# Patient Record
Sex: FEMALE | Race: BLACK OR AFRICAN AMERICAN | URBAN - METROPOLITAN AREA
[De-identification: names, ages, dates, MRNs, and addresses within clinical notes are randomized per-mention and may not be internally consistent; named-entity substitution may affect disease eponyms.]

---

## 2023-01-06 ENCOUNTER — OFFICE VISIT (OUTPATIENT)
Dept: URBAN - METROPOLITAN AREA CLINIC 84 | Facility: CLINIC | Age: 28
End: 2023-01-06
Payer: COMMERCIAL

## 2023-01-06 ENCOUNTER — WEB ENCOUNTER (OUTPATIENT)
Dept: URBAN - METROPOLITAN AREA CLINIC 84 | Facility: CLINIC | Age: 28
End: 2023-01-06

## 2023-01-06 VITALS
WEIGHT: 188.6 LBS | SYSTOLIC BLOOD PRESSURE: 102 MMHG | TEMPERATURE: 96.9 F | BODY MASS INDEX: 35.61 KG/M2 | HEART RATE: 71 BPM | DIASTOLIC BLOOD PRESSURE: 69 MMHG | HEIGHT: 61 IN

## 2023-01-06 DIAGNOSIS — R10.13 DYSPEPSIA: ICD-10-CM

## 2023-01-06 DIAGNOSIS — R07.89 OTHER CHEST PAIN: ICD-10-CM

## 2023-01-06 DIAGNOSIS — R14.2 ERUCTATION: ICD-10-CM

## 2023-01-06 PROCEDURE — 99204 OFFICE O/P NEW MOD 45 MIN: CPT | Performed by: INTERNAL MEDICINE

## 2023-01-06 NOTE — HPI-TODAY'S VISIT:
This patient was referred by Dr. Precious Rico for an evaluation of abdominal pain.  A copy of this will be sent to the referring provider.  Since 2018 she has been having intermittent symptoms of belch and indigestion.  She has had significant weight gain.   She has not changed her diet at all.  he belch does increase with dairy and greasy foods.  She phillip GERD but she feel like gas is trapped in her chest.  SHe denies abdominal pain.  She has occasional N/V associated with the belch.  She denies dysphagia.  She denies early satiety.  She has chronic constipation.  She can go a wek without a BM.  She denies LGI bleed or melena.  Ther eis no change in her belch if she doesn't  her BM's. She tried PPI and pepcid without any change in her symptoms.  It caused a HA so she stopped it.  She has asthma/seasonal allergies/eczema.  She developed hives last week.  She is on probiotics without relief

## 2023-02-01 ENCOUNTER — OFFICE VISIT (OUTPATIENT)
Dept: URBAN - METROPOLITAN AREA SURGERY CENTER 20 | Facility: SURGERY CENTER | Age: 28
End: 2023-02-01
Payer: COMMERCIAL

## 2023-02-01 ENCOUNTER — TELEPHONE ENCOUNTER (OUTPATIENT)
Dept: URBAN - METROPOLITAN AREA CLINIC 92 | Facility: CLINIC | Age: 28
End: 2023-02-01

## 2023-02-01 DIAGNOSIS — K21.00 ALKALINE REFLUX ESOPHAGITIS: ICD-10-CM

## 2023-02-01 DIAGNOSIS — K29.30 CHRONIC SUPERFICIAL GASTRITIS: ICD-10-CM

## 2023-02-01 PROCEDURE — G8907 PT DOC NO EVENTS ON DISCHARG: HCPCS | Performed by: INTERNAL MEDICINE

## 2023-02-01 PROCEDURE — 43239 EGD BIOPSY SINGLE/MULTIPLE: CPT | Performed by: INTERNAL MEDICINE

## 2023-02-01 RX ORDER — PANTOPRAZOLE SODIUM 40 MG/1
1 TABLET TABLET, DELAYED RELEASE ORAL ONCE A DAY
Qty: 30 TABLET | Refills: 5 | OUTPATIENT
Start: 2023-02-01

## 2023-05-01 ENCOUNTER — DASHBOARD ENCOUNTERS (OUTPATIENT)
Age: 28
End: 2023-05-01

## 2023-05-04 ENCOUNTER — OFFICE VISIT (OUTPATIENT)
Dept: URBAN - METROPOLITAN AREA CLINIC 84 | Facility: CLINIC | Age: 28
End: 2023-05-04
Payer: COMMERCIAL

## 2023-05-04 VITALS
DIASTOLIC BLOOD PRESSURE: 77 MMHG | HEIGHT: 61 IN | BODY MASS INDEX: 36.44 KG/M2 | SYSTOLIC BLOOD PRESSURE: 114 MMHG | WEIGHT: 193 LBS | TEMPERATURE: 98 F | HEART RATE: 86 BPM

## 2023-05-04 DIAGNOSIS — R19.4 CHANGE IN BOWEL HABIT: ICD-10-CM

## 2023-05-04 DIAGNOSIS — K59.01 CONSTIPATION: ICD-10-CM

## 2023-05-04 DIAGNOSIS — R10.13 DYSPEPSIA: ICD-10-CM

## 2023-05-04 DIAGNOSIS — R14.2 ERUCTATION: ICD-10-CM

## 2023-05-04 PROCEDURE — 99213 OFFICE O/P EST LOW 20 MIN: CPT | Performed by: INTERNAL MEDICINE

## 2023-05-04 RX ORDER — PANTOPRAZOLE SODIUM 40 MG/1
1 TABLET TABLET, DELAYED RELEASE ORAL ONCE A DAY
Qty: 30 TABLET | Refills: 5
Start: 2023-02-01

## 2023-05-04 RX ORDER — PANTOPRAZOLE SODIUM 40 MG/1
1 TABLET TABLET, DELAYED RELEASE ORAL ONCE A DAY
Qty: 30 TABLET | Refills: 5 | COMMUNITY
Start: 2023-02-01

## 2023-05-04 NOTE — HPI-TODAY'S VISIT:
EGd had gastritis but no HP Infection.  SHe has been using the Protonix.  It does help her symptoms.  She had no symptoms while she was taking it daily.  Overall she is satisfoed with her symptoms while she takes it.  She denies anroexia or weight loss.  She denies abdominal pain.  She can go a few days without a BM.  She started taking fiber gummies with some relief.  She denies LGI bleed or melena.  She denies significant UGI symptoms. She is concerned that she has been unable to lose weight despite dietary changes.  SHe has not had TFT's

## 2023-05-10 LAB — TSH W/REFLEX TO FT4: 1.24
